# Patient Record
Sex: FEMALE | Race: BLACK OR AFRICAN AMERICAN | Employment: OTHER | URBAN - METROPOLITAN AREA
[De-identification: names, ages, dates, MRNs, and addresses within clinical notes are randomized per-mention and may not be internally consistent; named-entity substitution may affect disease eponyms.]

---

## 2022-11-15 ENCOUNTER — OFFICE VISIT (OUTPATIENT)
Dept: INTERNAL MEDICINE CLINIC | Age: 52
End: 2022-11-15
Payer: COMMERCIAL

## 2022-11-15 VITALS
WEIGHT: 167 LBS | BODY MASS INDEX: 25.39 KG/M2 | OXYGEN SATURATION: 93 % | DIASTOLIC BLOOD PRESSURE: 80 MMHG | SYSTOLIC BLOOD PRESSURE: 120 MMHG | HEART RATE: 77 BPM

## 2022-11-15 DIAGNOSIS — E78.2 MIXED HYPERLIPIDEMIA: ICD-10-CM

## 2022-11-15 DIAGNOSIS — G43.909 MIGRAINE WITHOUT STATUS MIGRAINOSUS, NOT INTRACTABLE, UNSPECIFIED MIGRAINE TYPE: ICD-10-CM

## 2022-11-15 DIAGNOSIS — F31.9 BIPOLAR AFFECTIVE DISORDER, REMISSION STATUS UNSPECIFIED (HCC): ICD-10-CM

## 2022-11-15 DIAGNOSIS — F17.210 SMOKES CIGARETTES: ICD-10-CM

## 2022-11-15 DIAGNOSIS — I10 PRIMARY HYPERTENSION: Primary | ICD-10-CM

## 2022-11-15 DIAGNOSIS — Z13.9 SCREENING FOR CONDITION: ICD-10-CM

## 2022-11-15 DIAGNOSIS — J43.9 PULMONARY EMPHYSEMA, UNSPECIFIED EMPHYSEMA TYPE (HCC): ICD-10-CM

## 2022-11-15 DIAGNOSIS — R73.03 PREDIABETES: ICD-10-CM

## 2022-11-15 DIAGNOSIS — G89.29 OTHER CHRONIC PAIN: ICD-10-CM

## 2022-11-15 PROCEDURE — 99406 BEHAV CHNG SMOKING 3-10 MIN: CPT | Performed by: INTERNAL MEDICINE

## 2022-11-15 PROCEDURE — 3023F SPIROM DOC REV: CPT | Performed by: INTERNAL MEDICINE

## 2022-11-15 PROCEDURE — 4004F PT TOBACCO SCREEN RCVD TLK: CPT | Performed by: INTERNAL MEDICINE

## 2022-11-15 PROCEDURE — 3074F SYST BP LT 130 MM HG: CPT | Performed by: INTERNAL MEDICINE

## 2022-11-15 PROCEDURE — 3017F COLORECTAL CA SCREEN DOC REV: CPT | Performed by: INTERNAL MEDICINE

## 2022-11-15 PROCEDURE — G8427 DOCREV CUR MEDS BY ELIG CLIN: HCPCS | Performed by: INTERNAL MEDICINE

## 2022-11-15 PROCEDURE — 99204 OFFICE O/P NEW MOD 45 MIN: CPT | Performed by: INTERNAL MEDICINE

## 2022-11-15 PROCEDURE — G8419 CALC BMI OUT NRM PARAM NOF/U: HCPCS | Performed by: INTERNAL MEDICINE

## 2022-11-15 PROCEDURE — 3079F DIAST BP 80-89 MM HG: CPT | Performed by: INTERNAL MEDICINE

## 2022-11-15 PROCEDURE — G8484 FLU IMMUNIZE NO ADMIN: HCPCS | Performed by: INTERNAL MEDICINE

## 2022-11-15 RX ORDER — PREGABALIN 225 MG/1
225 CAPSULE ORAL 2 TIMES DAILY
COMMUNITY

## 2022-11-15 RX ORDER — TOPIRAMATE 25 MG/1
25 TABLET ORAL DAILY
COMMUNITY

## 2022-11-15 RX ORDER — LOSARTAN POTASSIUM 25 MG/1
25 TABLET ORAL DAILY
COMMUNITY
End: 2022-11-15 | Stop reason: SDUPTHER

## 2022-11-15 RX ORDER — LOSARTAN POTASSIUM 25 MG/1
25 TABLET ORAL DAILY
Qty: 30 TABLET | Refills: 3 | Status: SHIPPED | OUTPATIENT
Start: 2022-11-15

## 2022-11-15 NOTE — PROGRESS NOTES
SUBJECTIVE:  Winifred Garrison is a 46 y.o. female HERE FOR   Chief Complaint   Patient presents with    New Patient     ESTABLISH CARE    Hypertension        PT HERE TO INITIATE  CARE           PREVIOUS PCP:  DR Yousif Araujo      HTN - ON COZAAR. + DIET / EXERCISE COMPLIANCE. + HA, NO DIZZINESS  HLP - OUTSIDE LABS D/W PT. DIET / EXERCISE REVIEWED  PREDIABETES - OUTSIDE LAB D/W PT. DIET / EXERCISE REVIEWED  COPD -  DENIES WHEEZING, NO SOB, NO INCREASED INHALER USE . FOLLOWING WITH PULM  BIPOLAR D/O - STATES ON MEDS. + INSOMNIA. DENIES SUICIDAL / NO HOMICIDAL THOUGHTS / IDEATION  MIGRAINE HA - ON TOPAMAX. LAST EPISODE < 1 WEEK  CHRONIC PAIN - FOLLOWING WITH PAIN MGT AT   + SMOKER  - CESSATION ADVISED  SCREENING LABS D/W PT      DENIES CP, No SOB, No PALPITATIONS, No COUGH, NO F/C  No ABD PAIN, No N/V, No DIARRHEA, No CONSTIPATION, No MELENA, No HEMATOCHEZIA. No DYSURIA, No FREQ, No URGENCY, No HEMATURIA    PMH: REVIEWED AND UPDATED TODAY    PSH: REVIEWED AND UPDATED TODAY    SOCIAL HX: REVIEWED AND UPDATED TODAY    FAMILY HX: REVIEWED AND UPDATED TODAY    ALLERGY:  Patient has no known allergies. MEDS: REVIEWED  Prior to Visit Medications    Medication Sig Taking? Authorizing Provider   pregabalin (LYRICA) 225 MG capsule Take 225 mg by mouth 2 times daily.  Yes Historical Provider, MD   losartan (COZAAR) 25 MG tablet Take 25 mg by mouth daily Yes Historical Provider, MD   topiramate (TOPAMAX) 25 MG tablet Take 25 mg by mouth daily Yes Historical Provider, MD   acetaminophen (TYLENOL) 325 MG tablet acetaminophen 325 mg tablet Yes Historical Provider, MD   albuterol sulfate HFA (PROVENTIL;VENTOLIN;PROAIR) 108 (90 Base) MCG/ACT inhaler albuterol sulfate HFA 90 mcg/actuation aerosol inhaler Yes Historical Provider, MD   Capsaicin 0.1 % CREA Arthritis Pain Relief (capsaicin) 0.1 % topical cream Yes Historical Provider, MD   chlorzoxazone (PARAFON FORTE) 500 MG tablet  Yes Historical Provider, MD   chlorzoxazone (PARAFON FORTE) 500 MG tablet Take 500 mg by mouth 3 times daily as needed Yes Historical Provider, MD   Cholecalciferol 50 MCG (2000 UT) CAPS Vitamin D3 50 mcg (2,000 unit) capsule Yes Historical Provider, MD   diclofenac sodium (VOLTAREN) 1 % GEL diclofenac 1 % topical gel Yes Historical Provider, MD   ergocalciferol (ERGOCALCIFEROL) 1.25 MG (25719 UT) capsule ergocalciferol (vitamin D2) 1,250 mcg (50,000 unit) capsule Yes Historical Provider, MD   vitamin D (ERGOCALCIFEROL) 1.25 MG (49375 UT) CAPS capsule  Yes Historical Provider, MD   estradiol (ESTRACE) 0.5 MG tablet estradiol 0.5 mg tablet Yes Historical Provider, MD   estradiol (ESTRACE) 1 MG tablet  Yes Historical Provider, MD   lidocaine (LIDODERM) 5 % lidocaine 5 % topical patch Yes Historical Provider, MD   lurasidone (LATUDA) 20 MG TABS tablet Latuda 20 mg tablet Yes Historical Provider, MD   meloxicam (MOBIC) 7.5 MG tablet  Yes Historical Provider, MD   tiotropium (SPIRIVA RESPIMAT) 2.5 MCG/ACT AERS inhaler Inhale 2 puffs into the lungs daily Yes Historical Provider, MD   venlafaxine (EFFEXOR) 100 MG tablet Take 100 mg by mouth 3 times daily Yes Historical Provider, MD   progesterone (PROMETRIUM) 100 MG CAPS capsule  Yes Historical Provider, MD   vitamin B-2 (RIBOFLAVIN) 100 MG TABS tablet Vitamin B-2  100 mg tablet Yes Historical Provider, MD       OB/GYN: POST MENOPAUSAL                  LAST PAP SMEAR ? DATE                  LAST MAMMOGRAM 2020    IMMUNIZATION: INFLUENZA 2020, PNEUMOVAX 2017, PREVNAR 20 6/22, TDAP 2017, COVID X 2   NO SHINGLES        ROS: COMPREHENSIVE ROS AS IN HX, REST -VE  History obtained from chart review and the patient  NO PRIOR C SCOPE    OBJECTIVE:   NURSING NOTE AND VITALS REVIEWED  /80 (Site: Left Upper Arm, Position: Sitting, Cuff Size: Medium Adult)   Pulse 77   Wt 167 lb (75.8 kg)   SpO2 93%   BMI 25.39 kg/m²     NO ACUTE DISTRESS  + NICOTINE BREATH      REPEAT BP: 120/80 (LT), NO ORTHOSTASIS      Body mass index is 25.39 kg/m². HEENT: NO PALLOR, ANICTERIC, PERRLA, EOMI, NO CONJUNCTIVAL ERYTHEMA,                 NO SINUS TENDERNESS  NECK:  SUPPLE, TRACHEA MIDLINE, NT, NO JVD, NO CB, NO LA, NO TM, NO STIFFNESS  CHEST: RESPY EFFORT NL, GOOD AE, OCC WHEEZING, NO R/C  HEART: S1S2+ REG, NO M/G/R  ABD: SOFT, NT, NO HSM, BS+  EXT: NO EDEMA, NT, PULSES +. KATINA'S -VE  NEURO: ALERT AND ORIENTED X 3, NO MENINGEAL SIGNS, NO TREMORS, AMBULATING WITH A CANE - + LIMP OTHERWISE , NO FOCAL DEFICITS  PSYCH: FAIRLY GOOD AFFECT  BACK: NT, NO ROM, NO CVA TENDERNESS       PREVIOUS OUTSIDE LABS REVIEWED AND D/W PT    UA: PT UNABLE TO VOID    ASSESSMENT / PLAN:     Diagnosis Orders   1. Primary hypertension  COUNSELLED. STABLE ON MED. LOW NA+ / DASH DIET/ EXERCISE.   MONITOR. GOAL </= 130/80   MONITOR FOR HYPOTENSION  F/U LABS  MAKE CHANGES AS NEEDED. 2. Mixed hyperlipidemia  COUNSELLED. ADVISED LOW FAT / CHOL DIET/ EXERCISE.  MONITOR. F/U LABS  GOALS D/W PT.  MAKE CHANGES AS NEEDED. 3. Prediabetes  COUNSELLED. ADVISED ON DIET / EXERCISE  ADVISED RISK FACTOR MODIFICATION. F/U LABS TO REEVAL. MONITOR  MAKE CHANGES AS NEEDED. 4. Pulmonary emphysema, unspecified emphysema type ([facility] [address]  COUNSELLED. DEFERRED INHALER CHANE. CONTINUE MGT MONITOR. ADVISED SMOKING CESSATION  MAKE CHANGES AS NEEDED. 5. Bipolar affective disorder, remission status unspecified ([facility] [address]  COUNSELLED. MONITOR ON MEDS  PT COUNSELLED  ON RELAXATION TECHNIQUES. ADDRESSED STRESS MGT. PT NOT SUICIDAL/ NOT HOMICIDAL  Referral to Psychiatry FOR EVAL/ MGT  MAKE CHANGES AS NEEDED. 6. Migraine without status migrainosus, not intractable, unspecified migraine type  COUNSELLED. MONITOR ON MED.   MAKE CHANGES AS NEEDED. 7. Other chronic pain  COUNSELLED. EXERCISES. ANALGESICS PRN. MONITOR. F/U PAIN MGT.  MAKE CHANGES AS NEEDED. 8. Smokes cigarettes  Smoking cessation was encouraged. Cessation techniques reviewed today. COUNSELLED.  CESSATION ADVISED   [place] TOBACCO USE CESSATION INTERMEDIATE 3-10 MINUTES (6 MINS)  COMPLICATIONS OF TOBACCO USE INCLUDING COPD, CANCER, CAD D/W PT  PT VERBALIZED UNDERSTANDING  MAKE CHANGES AS NEEDED. 9. Screening for condition  COUNSELLED. LABS TO EVAL - OK WITH PT  MAKE CHANGES AS NEEDED.                  PT DECLINED INFLUENZA VACCINE          MEDICATION SIDE EFFECTS D/W PATIENT    RETURN TO CLINIC WITHIN 2 MONTHS / PRN    FOLLOW UP FOR FASTING LABS

## 2022-11-15 NOTE — PATIENT INSTRUCTIONS
TAKE MED AS ADVISED    DIET/ EXERCISE. FOLLOW UP WITHIN 2 MONTHS / AS NEEDED    FOLLOW UP FOR FASTING 5 [facility] Laboratory Locations - No appointment necessary. @ indicates the location is open Saturdays in addition to Monday through Friday. Call your preferred location for test preparation, business hours and other information you need. [employer] accepts [employer]. [facility]    @ [place] [facility] [address] [facility] [id number] [place] [address]. [place], [address]   Ph: [phone] [facility] Lab Sv[facility]. [address]   Ph: [phone]  @ [place] [facility] [address] [facility]   Ph: [phone]    [facility] S[facility]. [address] Melissa Perez [address]   Ph: [phone] @ [place] [facility]. [address]  Ph: [phone] @ [facility] Lab Svcs. [address] [facility]. Matias Gonzalez [address]   Ph: [phone]     [facility] S[facility] [place] [address]  Ph: [phone]    [place]   @ [place] [facility] [address] [place], [place] [address]   Ph: [phone] Nilsa Gens [facility] Bldg. [address] Nilsa Gens, [address] [facility]  Ph: [phone] [facility] 95, [address]   [facility] [address] Lab Svcs. [address] [facility]   Ph: [phone] [facility]. Lab Svcs.   [address] [facility], [address] [facility]   Ph: [phone]